# Patient Record
Sex: MALE | Race: BLACK OR AFRICAN AMERICAN | NOT HISPANIC OR LATINO | Employment: PART TIME | URBAN - METROPOLITAN AREA
[De-identification: names, ages, dates, MRNs, and addresses within clinical notes are randomized per-mention and may not be internally consistent; named-entity substitution may affect disease eponyms.]

---

## 2020-09-12 ENCOUNTER — OFFICE VISIT (OUTPATIENT)
Dept: URGENT CARE | Facility: MEDICAL CENTER | Age: 25
End: 2020-09-12
Payer: OTHER GOVERNMENT

## 2020-09-12 VITALS
HEIGHT: 68 IN | TEMPERATURE: 97.5 F | BODY MASS INDEX: 27.28 KG/M2 | OXYGEN SATURATION: 98 % | HEART RATE: 65 BPM | RESPIRATION RATE: 20 BRPM | WEIGHT: 180 LBS

## 2020-09-12 DIAGNOSIS — J02.9 ACUTE PHARYNGITIS, UNSPECIFIED ETIOLOGY: Primary | ICD-10-CM

## 2020-09-12 DIAGNOSIS — Z11.59 SPECIAL SCREENING EXAMINATION FOR UNSPECIFIED VIRAL DISEASE: ICD-10-CM

## 2020-09-12 LAB — S PYO AG THROAT QL: NEGATIVE

## 2020-09-12 PROCEDURE — 87070 CULTURE OTHR SPECIMN AEROBIC: CPT | Performed by: FAMILY MEDICINE

## 2020-09-12 PROCEDURE — U0003 INFECTIOUS AGENT DETECTION BY NUCLEIC ACID (DNA OR RNA); SEVERE ACUTE RESPIRATORY SYNDROME CORONAVIRUS 2 (SARS-COV-2) (CORONAVIRUS DISEASE [COVID-19]), AMPLIFIED PROBE TECHNIQUE, MAKING USE OF HIGH THROUGHPUT TECHNOLOGIES AS DESCRIBED BY CMS-2020-01-R: HCPCS | Performed by: FAMILY MEDICINE

## 2020-09-12 PROCEDURE — 87147 CULTURE TYPE IMMUNOLOGIC: CPT | Performed by: FAMILY MEDICINE

## 2020-09-12 PROCEDURE — G0382 LEV 3 HOSP TYPE B ED VISIT: HCPCS | Performed by: FAMILY MEDICINE

## 2020-09-12 RX ORDER — ALBUTEROL SULFATE 90 UG/1
2 AEROSOL, METERED RESPIRATORY (INHALATION) EVERY 6 HOURS PRN
COMMUNITY

## 2020-09-12 RX ORDER — AMOXICILLIN 875 MG/1
875 TABLET, COATED ORAL 2 TIMES DAILY
Qty: 20 TABLET | Refills: 0 | Status: SHIPPED | OUTPATIENT
Start: 2020-09-12 | End: 2020-09-22

## 2020-09-12 NOTE — PATIENT INSTRUCTIONS
Rapid strep test negative  Throat culture performed  COVID-19 test performed  Patient advised to take Tylenol ibuprofen as needed for sore throat or cough drops  If symptoms persist after 24 hours, he is to fill prescription for amoxicillin 875 mg twice a day for 10 days  Novel Coronavirus   WHAT YOU NEED TO KNOW:   The nCoV is a new strain (type) of coronavirus that can cause severe respiratory (lung) infection  DISCHARGE INSTRUCTIONS:   Call your local emergency number (911 in the [address] [place] [address],[address]) for any of the following:  Tell the  you may have nCoV  This will help anyone in the ambulance stay safe, and to call ahead to the hospital   · You have sudden shortness of breath  · You have a fast heartbeat or chest pain  Return to the emergency department if:   · You feel so dizzy that you have trouble standing up  · Your lips and fingernails turn blue  Call your doctor if:   · You have a fever over 102ºF (39ºC)  · Your sore throat gets worse or you see white or yellow spots in your throat  · Your symptoms get worse after 3 to 5 days or your cold is not better in 14 days  · You have a rash anywhere on your skin  · You have large, tender lumps in your neck  · You have thick, green, or yellow drainage from your nose  · You cough up thick yellow, green, or bloody mucus  · You are vomiting for more than 24 hours and cannot keep fluids down  · You have a bad earache  · You have questions or concerns about your condition or care  Medicines: You may need any of the following:  · Decongestants  help reduce nasal congestion and help you breathe more easily  If you take decongestant pills, they may make you feel restless or cause problems with your sleep  Do not use decongestant sprays for more than a few days  · Cough suppressants  help reduce coughing  Ask your healthcare provider which type of cough medicine is best for you       · NSAIDs , such as ibuprofen, help decrease swelling, pain, and fever  NSAIDs can cause stomach bleeding or kidney problems in certain people  If you take blood thinner medicine, always ask your healthcare provider if NSAIDs are safe for you  Always read the medicine label and follow directions  · Acetaminophen  decreases pain and fever  It is available without a doctor's order  Ask how much to take and how often to take it  Follow directions  Read the labels of all other medicines you are using to see if they also contain acetaminophen, or ask your doctor or pharmacist  Acetaminophen can cause liver damage if not taken correctly  Do not use more than 4 grams (4,000 milligrams) total of acetaminophen in one day  · Take your medicine as directed  Contact your healthcare provider if you think your medicine is not helping or if you have side effects  Tell him or her if you are allergic to any medicine  Keep a list of the medicines, vitamins, and herbs you take  Include the amounts, and when and why you take them  Bring the list or the pill bottles to follow-up visits  Carry your medicine list with you in case of an emergency  Help relieve mild symptoms:   · Drink more liquids as directed  Liquids will help thin and loosen mucus so you can cough it up  Liquids will also help prevent dehydration  Liquids that help prevent dehydration include water, fruit juice, and broth  Do not drink liquids that contain caffeine  Caffeine can increase your risk for dehydration  Ask your healthcare provider how much liquid to drink each day  · Soothe a sore throat  Gargle with warm salt water  This helps your sore throat feel better  Make salt water by dissolving ¼ teaspoon salt in 1 cup warm water  You may also suck on hard candy or throat lozenges  You may use a sore throat spray  · Use a humidifier or vaporizer  Use a cool mist humidifier or a vaporizer to increase air moisture in your home   This may make it easier for you to breathe and help decrease your cough     · Use saline nasal drops as directed  These help relieve congestion  · Apply petroleum-based jelly around the outside of your nostrils  This can decrease irritation from blowing your nose  · Do not smoke  Nicotine and other chemicals in cigarettes and cigars can make your symptoms worse  They can also cause infections such as bronchitis or pneumonia  Ask your healthcare provider for information if you currently smoke and need help to quit  E-cigarettes or smokeless tobacco still contain nicotine  Talk to your healthcare provider before you use these products  Prevent the spread of nCoV:  If you are around anyone who has nCoV, the following can help you protect you from infection  Have the person follow the guidelines to prevent infecting others:  · Limit close contact with others  Anyone with nCoV should stay in a different room, or sleep in a separate bed  Others need to stay at least 6 feet (2 meters) away  The person should only go out of the house for medical appointments  He or she should always call the office of any healthcare provider  The provider will need to prepare to keep others safe  · Wear a medical mask when around others  You can wear a medical mask or have the person wear one  A medical mask will help prevent nCoV from spreading  · Cover your mouth and nose to sneeze or cough  Everyone should always cover a sneeze or cough  Use a tissue that covers the mouth and nose  Use the bend of our arm if a tissue is not available  Throw the tissue away in a trash can right away  Then wash your hands well with soap and water  Use hand  that contains alcohol if you cannot wash your hands  · Wash your hands often  You and everyone in your home must wash your hands throughout the day  Use soap and water  Use germ-killing gel if soap and water are not available   A person with nCoV should not touch his or her eyes or mouth without washing his or her hands first  · Do not share items  Do not share dishes, towels, or other items with anyone  Always wash items after a person who has nCoV uses them  · Clean surfaces often  Use household  or bleach diluted with water to clean counters, doorknobs, toilet seats, and other surfaces  · Do not handle live animals  The nCoV may be spread to animals, including pets  Until more is known, it is best for a person with nCoV not to touch, play with, or handle live animals  Follow up with your doctor as directed:  Write down your questions so you remember to ask them during your visits  © Copyright [address] Information is for End User's use only and may not be sold, redistributed or otherwise used for commercial purposes  All illustrations and images included in CareNotes® are the copyrighted property of A D A M , Inc  or Loida Parada   The above information is an  only  It is not intended as medical advice for individual conditions or treatments  Talk to your doctor, nurse or pharmacist before following any medical regimen to see if it is safe and effective for you  Pharyngitis   WHAT YOU NEED TO KNOW:   Pharyngitis, or sore throat, is inflammation of the tissues and structures in your pharynx (throat)  Pharyngitis is most often caused by bacteria  It may also be caused by a cold or flu virus  Other causes include smoking, allergies, or acid reflux  DISCHARGE INSTRUCTIONS:   Call 911 for any of the following:   · You have trouble breathing or swallowing because your throat is swollen or sore  Return to the emergency department if:   · You are drooling because it hurts too much to swallow  · Your fever is higher than 102? F (39?C) or lasts longer than 3 days  · You are confused  · You taste blood in your throat  Contact your healthcare provider if:   · Your throat pain gets worse  · You have a painful lump in your throat that does not go away after 5 days      · Your symptoms do not improve after 5 days  · You have questions or concerns about your condition or care  Medicines:  Viral pharyngitis will go away on its own without treatment  Your sore throat should start to feel better in 3 to 5 days for both viral and bacterial infections  You may need any of the following:  · Antibiotics  treat a bacterial infection  · NSAIDs , such as ibuprofen, help decrease swelling, pain, and fever  NSAIDs can cause stomach bleeding or kidney problems in certain people  If you take blood thinner medicine, always ask your healthcare provider if NSAIDs are safe for you  Always read the medicine label and follow directions  · Acetaminophen  decreases pain and fever  It is available without a doctor's order  Ask how much to take and how often to take it  Follow directions  Acetaminophen can cause liver damage if not taken correctly  · Take your medicine as directed  Contact your healthcare provider if you think your medicine is not helping or if you have side effects  Tell him or her if you are allergic to any medicine  Keep a list of the medicines, vitamins, and herbs you take  Include the amounts, and when and why you take them  Bring the list or the pill bottles to follow-up visits  Carry your medicine list with you in case of an emergency  Manage your symptoms:   · Gargle salt water  Mix ¼ teaspoon salt in an 8 ounce glass of warm water and gargle  This may help decrease swelling in your throat  · Drink liquids as directed  You may need to drink more liquids than usual  Liquids may help soothe your throat and prevent dehydration  Ask how much liquid to drink each day and which liquids are best for you  · Use a cool-steam humidifier  to help moisten the air in your room and calm your cough  · Soothe your throat  with cough drops, ice, soft foods, or popsicles  Prevent the spread of pharyngitis:  Cover your mouth and nose when you cough or sneeze   Do not share food or drinks  Wash your hands often  Use soap and water  If soap and water are unavailable, use an alcohol based hand   Follow up with your healthcare provider as directed:  Write down your questions so you remember to ask them during your visits  © 2017 [address] Information is for End User's use only and may not be sold, redistributed or otherwise used for commercial purposes  All illustrations and images included in CareNotes® are the copyrighted property of A D A M , Inc  or Maximiliano Tavarez  The above information is an  only  It is not intended as medical advice for individual conditions or treatments  Talk to your doctor, nurse or pharmacist before following any medical regimen to see if it is safe and effective for you

## 2020-09-12 NOTE — LETTER
September 12, 2020     Patient: Eugenia Juarez   YOB: 1995   Date of Visit: 9/12/2020       To Whom It May Concern: It is my medical opinion that Eugenia Juarez may return to work on 9/16/2020  If you have any questions or concerns, please don't hesitate to call           Sincerely,        Angeline Orr MD    CC: No Recipients

## 2020-09-12 NOTE — PROGRESS NOTES
[facility]        NAME: Fide Rossi is a 22 y o  male  : 1995    MRN: [id number]  DATE: 2020  TIME: 2:26 PM    Assessment and Plan   Acute pharyngitis, unspecified etiology [J02 9]  1  Acute pharyngitis, unspecified etiology  POCT rapid strepA    Throat culture    amoxicillin (AMOXIL) 875 mg tablet   2  Special screening examination for unspecified viral disease  Novel Coronavirus (COVID-19), PCR [facility] - Office Collection         Patient Instructions       Follow up with PCP in 3-5 days  Proceed to  ER if symptoms worsen  Chief Complaint     Chief Complaint   Patient presents with    Sore Throat     Patient presents with a sore throat since this morning  He denies fever, chills, SOB, fatigue, change in taste or smell  Patient was in contact with his girlfriend who is positive for strep, but did not yet get her results for covid  Patient needs to be tested in order to return to work  History of Present Illness       50-year-old male here today with acute onset of scratchy throat this morning and chest tightness  At the present time on exam, he is asymptomatic  Zakia Skipper However, denies fever, chills  Denies nausea, vomiting or diarrhea  However he expresses concern because he found out that his girlfriend tested positive for strep throat yesterday  She was also tested for COVID-19 however test results are still pending  Patient denies any known contact with anyone testing positive for COVID-19  Review of Systems   Review of Systems   Constitutional: Negative  HENT: Positive for congestion and sore throat  Respiratory: Negative  Gastrointestinal: Negative  Neurological: Negative            Current Medications       Current Outpatient Medications:     albuterol (PROVENTIL HFA,VENTOLIN HFA) 90 mcg/act inhaler, Inhale 2 puffs every 6 (six) hours as needed for wheezing, Disp: , Rfl:     amoxicillin (AMOXIL) 875 mg tablet, Take 1 tablet (875 mg total) by mouth 2 (two) times a day for 10 days, Disp: 20 tablet, Rfl: 0    Current Allergies     Allergies as of 09/12/2020    (No Known Allergies)            The following portions of the patient's history were reviewed and updated as appropriate: allergies, current medications, past family history, past medical history, past social history, past surgical history and problem list      Past Medical History:   Diagnosis Date    Asthma        History reviewed  No pertinent surgical history  History reviewed  No pertinent family history  Medications have been verified  Objective   Pulse 65   Temp 97 5 °F (36 4 °C) (Tympanic)   Resp 20   Ht 5' 8" (1 727 m)   Wt 81 6 kg (180 lb)   SpO2 98%   BMI 27 37 kg/m²        Physical Exam     Physical Exam  Vitals signs reviewed  Constitutional:       Appearance: He is well-developed  HENT:      Nose:      Comments: Erythematous hypertrophic boggy left turbinates  Mouth/Throat:      Comments: Cobblestoning observed the posterior pharynx  No exudates visualized  Neck:      Musculoskeletal: Normal range of motion  Pulmonary:      Effort: Pulmonary effort is normal       Breath sounds: Normal breath sounds  Neurological:      Mental Status: He is alert

## 2020-09-14 LAB — SARS-COV-2 RNA SPEC QL NAA+PROBE: NOT DETECTED

## 2020-09-15 ENCOUNTER — TELEPHONE (OUTPATIENT)
Dept: URGENT CARE | Facility: MEDICAL CENTER | Age: 25
End: 2020-09-15

## 2020-09-15 LAB — BACTERIA THROAT CULT: ABNORMAL

## 2020-09-15 NOTE — TELEPHONE ENCOUNTER
Spoke with the patient regarding throat culture which grew strep group C  Currently he is asymptomatic  He did not fill prescription for amoxicillin  Strongly advised patient to fill prescription for amoxicillin complete course  He expressed understanding

## 2021-02-16 ENCOUNTER — HOSPITAL ENCOUNTER (INPATIENT)
Facility: HOSPITAL | Age: 26
LOS: 3 days | Discharge: HOME | DRG: 885 | End: 2021-02-19
Attending: STUDENT IN AN ORGANIZED HEALTH CARE EDUCATION/TRAINING PROGRAM | Admitting: PSYCHIATRY & NEUROLOGY

## 2021-02-16 ENCOUNTER — HOSPITAL ENCOUNTER (EMERGENCY)
Facility: HOSPITAL | Age: 26
End: 2021-02-16
Attending: EMERGENCY MEDICINE | Admitting: EMERGENCY MEDICINE

## 2021-02-16 VITALS
BODY MASS INDEX: 27.52 KG/M2 | OXYGEN SATURATION: 100 % | DIASTOLIC BLOOD PRESSURE: 88 MMHG | HEART RATE: 75 BPM | TEMPERATURE: 98.6 F | SYSTOLIC BLOOD PRESSURE: 157 MMHG | RESPIRATION RATE: 18 BRPM | WEIGHT: 181 LBS

## 2021-02-16 DIAGNOSIS — R45.851 DEPRESSION WITH SUICIDAL IDEATION: Primary | ICD-10-CM

## 2021-02-16 DIAGNOSIS — R45.851 DEPRESSION WITH SUICIDAL IDEATION: ICD-10-CM

## 2021-02-16 DIAGNOSIS — Z72.89 SELF MUTILATING BEHAVIOR: ICD-10-CM

## 2021-02-16 DIAGNOSIS — F33.9 RECURRENT MAJOR DEPRESSIVE DISORDER (HCC): ICD-10-CM

## 2021-02-16 DIAGNOSIS — F41.9 ANXIETY: Primary | ICD-10-CM

## 2021-02-16 DIAGNOSIS — F32.A DEPRESSION WITH SUICIDAL IDEATION: ICD-10-CM

## 2021-02-16 DIAGNOSIS — F32.A DEPRESSION WITH SUICIDAL IDEATION: Primary | ICD-10-CM

## 2021-02-16 DIAGNOSIS — Z72.0 TOBACCO ABUSE: ICD-10-CM

## 2021-02-16 LAB
AMPHETAMINES SERPL QL SCN: NEGATIVE
BARBITURATES UR QL: NEGATIVE
BENZODIAZ UR QL: NEGATIVE
COCAINE UR QL: NEGATIVE
ETHANOL EXG-MCNC: 0.03 MG/DL
FLUAV RNA RESP QL NAA+PROBE: NEGATIVE
FLUBV RNA RESP QL NAA+PROBE: NEGATIVE
METHADONE UR QL: NEGATIVE
OPIATES UR QL SCN: NEGATIVE
OXYCODONE+OXYMORPHONE UR QL SCN: NEGATIVE
PCP UR QL: NEGATIVE
RSV RNA RESP QL NAA+PROBE: NEGATIVE
SARS-COV-2 RNA RESP QL NAA+PROBE: NEGATIVE
THC UR QL: NEGATIVE

## 2021-02-16 PROCEDURE — 99285 EMERGENCY DEPT VISIT HI MDM: CPT | Performed by: EMERGENCY MEDICINE

## 2021-02-16 PROCEDURE — 0241U HB NFCT DS VIR RESP RNA 4 TRGT: CPT | Performed by: EMERGENCY MEDICINE

## 2021-02-16 PROCEDURE — 80307 DRUG TEST PRSMV CHEM ANLYZR: CPT | Performed by: EMERGENCY MEDICINE

## 2021-02-16 PROCEDURE — 82075 ASSAY OF BREATH ETHANOL: CPT | Performed by: EMERGENCY MEDICINE

## 2021-02-16 PROCEDURE — 99285 EMERGENCY DEPT VISIT HI MDM: CPT

## 2021-02-16 RX ORDER — RISPERIDONE 0.5 MG/1
0.5 TABLET, ORALLY DISINTEGRATING ORAL
Status: DISCONTINUED | OUTPATIENT
Start: 2021-02-16 | End: 2021-02-19 | Stop reason: HOSPADM

## 2021-02-16 RX ORDER — OLANZAPINE 10 MG/1
10 TABLET, ORALLY DISINTEGRATING ORAL ONCE
Status: DISCONTINUED | OUTPATIENT
Start: 2021-02-16 | End: 2021-02-16 | Stop reason: SDUPTHER

## 2021-02-16 RX ORDER — BENZTROPINE MESYLATE 1 MG/ML
1 INJECTION INTRAMUSCULAR; INTRAVENOUS
Status: DISCONTINUED | OUTPATIENT
Start: 2021-02-16 | End: 2021-02-19 | Stop reason: HOSPADM

## 2021-02-16 RX ORDER — LORAZEPAM 2 MG/ML
2 INJECTION INTRAMUSCULAR
Status: DISCONTINUED | OUTPATIENT
Start: 2021-02-16 | End: 2021-02-19 | Stop reason: HOSPADM

## 2021-02-16 RX ORDER — HYDROXYZINE HYDROCHLORIDE 25 MG/1
50 TABLET, FILM COATED ORAL
Status: CANCELLED | OUTPATIENT
Start: 2021-02-16

## 2021-02-16 RX ORDER — DIPHENHYDRAMINE HYDROCHLORIDE 50 MG/ML
50 INJECTION INTRAMUSCULAR; INTRAVENOUS EVERY 6 HOURS PRN
Status: CANCELLED | OUTPATIENT
Start: 2021-02-16

## 2021-02-16 RX ORDER — NICOTINE 21 MG/24HR
1 PATCH, TRANSDERMAL 24 HOURS TRANSDERMAL DAILY
Status: DISCONTINUED | OUTPATIENT
Start: 2021-02-17 | End: 2021-02-19 | Stop reason: HOSPADM

## 2021-02-16 RX ORDER — NICOTINE 21 MG/24HR
1 PATCH, TRANSDERMAL 24 HOURS TRANSDERMAL DAILY
Status: CANCELLED | OUTPATIENT
Start: 2021-02-17

## 2021-02-16 RX ORDER — OLANZAPINE 5 MG/1
10 TABLET, ORALLY DISINTEGRATING ORAL ONCE
Status: COMPLETED | OUTPATIENT
Start: 2021-02-16 | End: 2021-02-16

## 2021-02-16 RX ORDER — TRAZODONE HYDROCHLORIDE 50 MG/1
50 TABLET ORAL
Status: DISCONTINUED | OUTPATIENT
Start: 2021-02-16 | End: 2021-02-19 | Stop reason: HOSPADM

## 2021-02-16 RX ORDER — HALOPERIDOL 5 MG/ML
5 INJECTION INTRAMUSCULAR
Status: DISCONTINUED | OUTPATIENT
Start: 2021-02-16 | End: 2021-02-19 | Stop reason: HOSPADM

## 2021-02-16 RX ORDER — HALOPERIDOL 5 MG/ML
5 INJECTION INTRAMUSCULAR
Status: CANCELLED | OUTPATIENT
Start: 2021-02-16

## 2021-02-16 RX ORDER — ACETAMINOPHEN 325 MG/1
650 TABLET ORAL EVERY 8 HOURS PRN
Status: DISCONTINUED | OUTPATIENT
Start: 2021-02-16 | End: 2021-02-19 | Stop reason: HOSPADM

## 2021-02-16 RX ORDER — LORAZEPAM 1 MG/1
1 TABLET ORAL EVERY 6 HOURS PRN
Status: CANCELLED | OUTPATIENT
Start: 2021-02-16

## 2021-02-16 RX ORDER — NICOTINE 21 MG/24HR
1 PATCH, TRANSDERMAL 24 HOURS TRANSDERMAL DAILY
Status: DISCONTINUED | OUTPATIENT
Start: 2021-02-17 | End: 2021-02-16 | Stop reason: SDUPTHER

## 2021-02-16 RX ORDER — NICOTINE 21 MG/24HR
21 PATCH, TRANSDERMAL 24 HOURS TRANSDERMAL ONCE
Status: CANCELLED | OUTPATIENT
Start: 2021-02-16 | End: 2021-02-16

## 2021-02-16 RX ORDER — ACETAMINOPHEN 325 MG/1
650 TABLET ORAL EVERY 8 HOURS PRN
Status: DISCONTINUED | OUTPATIENT
Start: 2021-02-16 | End: 2021-02-16 | Stop reason: HOSPADM

## 2021-02-16 RX ORDER — NICOTINE 21 MG/24HR
21 PATCH, TRANSDERMAL 24 HOURS TRANSDERMAL ONCE
Status: DISCONTINUED | OUTPATIENT
Start: 2021-02-16 | End: 2021-02-16 | Stop reason: HOSPADM

## 2021-02-16 RX ORDER — NICOTINE 21 MG/24HR
21 PATCH, TRANSDERMAL 24 HOURS TRANSDERMAL ONCE
Status: DISCONTINUED | OUTPATIENT
Start: 2021-02-16 | End: 2021-02-16 | Stop reason: SDUPTHER

## 2021-02-16 RX ORDER — OLANZAPINE 5 MG/1
10 TABLET, ORALLY DISINTEGRATING ORAL ONCE
Status: CANCELLED | OUTPATIENT
Start: 2021-02-16 | End: 2021-02-16

## 2021-02-16 RX ORDER — LORAZEPAM 1 MG/1
1 TABLET ORAL EVERY 6 HOURS PRN
Status: DISCONTINUED | OUTPATIENT
Start: 2021-02-16 | End: 2021-02-19 | Stop reason: HOSPADM

## 2021-02-16 RX ORDER — LORAZEPAM 2 MG/ML
2 INJECTION INTRAMUSCULAR
Status: CANCELLED | OUTPATIENT
Start: 2021-02-16

## 2021-02-16 RX ORDER — BENZTROPINE MESYLATE 1 MG/ML
1 INJECTION INTRAMUSCULAR; INTRAVENOUS
Status: CANCELLED | OUTPATIENT
Start: 2021-02-16

## 2021-02-16 RX ORDER — HYDROXYZINE 50 MG/1
50 TABLET, FILM COATED ORAL
Status: DISCONTINUED | OUTPATIENT
Start: 2021-02-16 | End: 2021-02-19 | Stop reason: HOSPADM

## 2021-02-16 RX ORDER — LORAZEPAM 1 MG/1
1 TABLET ORAL EVERY 6 HOURS PRN
Status: DISCONTINUED | OUTPATIENT
Start: 2021-02-16 | End: 2021-02-16 | Stop reason: HOSPADM

## 2021-02-16 RX ORDER — RISPERIDONE 0.5 MG/1
0.5 TABLET, ORALLY DISINTEGRATING ORAL
Status: CANCELLED | OUTPATIENT
Start: 2021-02-16

## 2021-02-16 RX ORDER — ACETAMINOPHEN 325 MG/1
650 TABLET ORAL EVERY 8 HOURS PRN
Status: CANCELLED | OUTPATIENT
Start: 2021-02-16

## 2021-02-16 RX ORDER — DIPHENHYDRAMINE HYDROCHLORIDE 50 MG/ML
50 INJECTION INTRAMUSCULAR; INTRAVENOUS EVERY 6 HOURS PRN
Status: DISCONTINUED | OUTPATIENT
Start: 2021-02-16 | End: 2021-02-19 | Stop reason: HOSPADM

## 2021-02-16 RX ORDER — TRAZODONE HYDROCHLORIDE 100 MG/1
50 TABLET ORAL
Status: CANCELLED | OUTPATIENT
Start: 2021-02-16

## 2021-02-16 RX ADMIN — OLANZAPINE 10 MG: 5 TABLET, ORALLY DISINTEGRATING ORAL at 15:58

## 2021-02-16 RX ADMIN — NICOTINE 21 MG: 21 PATCH, EXTENDED RELEASE TRANSDERMAL at 18:05

## 2021-02-16 NOTE — LETTER
[facility] EMERGENCY DEPARTMENT  [address] [facility] [address] [phone]  Dept: [phone]      EMTALA TRANSFER CONSENT    NAME Anselmo RAMAN 1995                              MRN [id number]    I have been informed of my rights regarding examination, treatment, and transfer   by Dr Jose De Jesus Duenas DO    Benefits: Continuity of care    Risks: Potential for delay in receiving treatment      Consent for Transfer:  I acknowledge that my medical condition has been evaluated and explained to me by the emergency department physician or other qualified medical person and/or my attending physician, who has recommended that I be transferred to the service of  Accepting Physician: Dr Alan Baez at [address], [address] : [facility]  The above potential benefits of such transfer, the potential risks associated with such transfer, and the probable risks of not being transferred have been explained to me, and I fully understand them  The doctor has explained that, in my case, the benefits of transfer outweigh the risks  I agree to be transferred  I authorize the performance of emergency medical procedures and treatments upon me in both transit and upon arrival at the receiving facility  Additionally, I authorize the release of any and all medical records to the receiving facility and request they be transported with me, if possible  I understand that the safest mode of transportation during a medical emergency is an ambulance and that the Hospital advocates the use of this mode of transport  Risks of traveling to the receiving facility by car, including absence of medical control, life sustaining equipment, such as oxygen, and medical personnel has been explained to me and I fully understand them      (MELITON CORRECT BOX BELOW)  [  ]  I consent to the stated transfer and to be transported by ambulance/helicopter  [  ]  I consent to the stated transfer, but refuse transportation by ambulance and accept full responsibility for my transportation by car  I understand the risks of non-ambulance transfers and I exonerate the Hospital and its staff from any deterioration in my condition that results from this refusal     X___________________________________________    DATE  21  TIME________  Signature of patient or legally responsible individual signing on patient behalf           RELATIONSHIP TO PATIENT_________________________          Provider Certification    NAME Adonay Bautista                                         1995                              MRN [id number]    A medical screening exam was performed on the above named patient  Based on the examination:    Condition Necessitating Transfer The primary encounter diagnosis was Depression with suicidal ideation  Diagnoses of Self mutilating behavior and Tobacco abuse were also pertinent to this visit  Patient Condition: Other (Include comment)_________________________________________    Reason for Transfer: No bed available at level of patient's needs    Transfer Requirements: [address] [place] [address]   · Space available and qualified personnel available for treatment as acknowledged by Klever Sosa ; [phone]  · Agreed to accept transfer and to provide appropriate medical treatment as acknowledged by       Dr Salma Muñiz  · Appropriate medical records of the examination and treatment of the patient are provided at the time of transfer   [facility], [address] _______  · Transfer will be performed by qualified personnel from [address] [place] [address]  and appropriate transfer equipment as required, including the use of necessary and appropriate life support measures      Provider Certification: I have examined the patient and explained the following risks and benefits of being transferred/refusing transfer to the patient/family:  General risk, such as traffic hazards, adverse weather conditions, rough terrain or turbulence, possible failure of equipment (including vehicle or aircraft), or consequences of actions of persons outside the control of the transport personnel      Based on these reasonable risks and benefits to the patient and/or the unborn child(liyah), and based upon the information available at the time of the patients examination, I certify that the medical benefits reasonably to be expected from the provision of appropriate medical treatments at another medical facility outweigh the increasing risks, if any, to the individuals medical condition, and in the case of labor to the unborn child, from effecting the transfer      X____________________________________________ DATE 02/16/21        TIME_______      ORIGINAL - SEND TO MEDICAL RECORDS   COPY - SEND WITH PATIENT DURING TRANSFER
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
## 2021-02-16 NOTE — ED NOTES
Insurance         EVS (Eligibility Verification System) called - [phone]    Automated system indicates: no active for Medical assistance    Asked patient and he said that he has no health insurance

## 2021-02-16 NOTE — ED NOTES
Room is made safe for patient and he is placed on 1-1 monitoring      Lyubov Bradshaw RN  02/16/21 [id number]
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
## 2021-02-16 NOTE — ED NOTES
Pt refused to have anything to eat for dinner   He is laying in his room quietly      Selene [facility]  02/16/21 1988

## 2021-02-17 PROBLEM — F33.9 RECURRENT MAJOR DEPRESSIVE DISORDER (HCC): Status: ACTIVE | Noted: 2021-02-17

## 2021-02-17 PROBLEM — Z00.8 MEDICAL CLEARANCE FOR PSYCHIATRIC ADMISSION: Status: ACTIVE | Noted: 2021-02-17

## 2021-02-17 PROBLEM — F41.9 ANXIETY: Status: ACTIVE | Noted: 2021-02-17

## 2021-02-17 LAB
BASOPHILS # BLD AUTO: 0.01 THOUSANDS/ΜL (ref 0–0.1)
BASOPHILS NFR BLD AUTO: 0 % (ref 0–1)
EOSINOPHIL # BLD AUTO: 0.03 THOUSAND/ΜL (ref 0–0.61)
EOSINOPHIL NFR BLD AUTO: 1 % (ref 0–6)
ERYTHROCYTE [DISTWIDTH] IN BLOOD BY AUTOMATED COUNT: 12.5 % (ref 11.6–15.1)
HCT VFR BLD AUTO: 43.3 % (ref 36.5–49.3)
HGB BLD-MCNC: 13.9 G/DL (ref 12–17)
IMM GRANULOCYTES # BLD AUTO: 0 THOUSAND/UL (ref 0–0.2)
IMM GRANULOCYTES NFR BLD AUTO: 0 % (ref 0–2)
LYMPHOCYTES # BLD AUTO: 2.04 THOUSANDS/ΜL (ref 0.6–4.47)
LYMPHOCYTES NFR BLD AUTO: 47 % (ref 14–44)
MCH RBC QN AUTO: 27.9 PG (ref 26.8–34.3)
MCHC RBC AUTO-ENTMCNC: 32.1 G/DL (ref 31.4–37.4)
MCV RBC AUTO: 87 FL (ref 82–98)
MONOCYTES # BLD AUTO: 0.32 THOUSAND/ΜL (ref 0.17–1.22)
MONOCYTES NFR BLD AUTO: 8 % (ref 4–12)
NEUTROPHILS # BLD AUTO: 1.87 THOUSANDS/ΜL (ref 1.85–7.62)
NEUTS SEG NFR BLD AUTO: 44 % (ref 43–75)
NRBC BLD AUTO-RTO: 0 /100 WBCS
PLATELET # BLD AUTO: 280 THOUSANDS/UL (ref 149–390)
PMV BLD AUTO: 9.4 FL (ref 8.9–12.7)
RBC # BLD AUTO: 4.98 MILLION/UL (ref 3.88–5.62)
TSH SERPL DL<=0.05 MIU/L-ACNC: 0.38 UIU/ML (ref 0.36–3.74)
WBC # BLD AUTO: 4.27 THOUSAND/UL (ref 4.31–10.16)

## 2021-02-17 PROCEDURE — 99253 IP/OBS CNSLTJ NEW/EST LOW 45: CPT | Performed by: NURSE PRACTITIONER

## 2021-02-17 PROCEDURE — 84443 ASSAY THYROID STIM HORMONE: CPT | Performed by: PSYCHIATRY & NEUROLOGY

## 2021-02-17 PROCEDURE — 99222 1ST HOSP IP/OBS MODERATE 55: CPT | Performed by: STUDENT IN AN ORGANIZED HEALTH CARE EDUCATION/TRAINING PROGRAM

## 2021-02-17 PROCEDURE — 85025 COMPLETE CBC W/AUTO DIFF WBC: CPT | Performed by: PSYCHIATRY & NEUROLOGY

## 2021-02-17 RX ORDER — ESCITALOPRAM OXALATE 5 MG/1
5 TABLET ORAL DAILY
Status: DISCONTINUED | OUTPATIENT
Start: 2021-02-17 | End: 2021-02-19 | Stop reason: HOSPADM

## 2021-02-17 RX ORDER — ALBUTEROL SULFATE 90 UG/1
2 AEROSOL, METERED RESPIRATORY (INHALATION) EVERY 4 HOURS PRN
Status: DISCONTINUED | OUTPATIENT
Start: 2021-02-17 | End: 2021-02-19 | Stop reason: HOSPADM

## 2021-02-17 RX ADMIN — ESCITALOPRAM 5 MG: 5 TABLET, FILM COATED ORAL at 10:53

## 2021-02-17 RX ADMIN — TRAZODONE HYDROCHLORIDE 50 MG: 50 TABLET ORAL at 22:07

## 2021-02-17 RX ADMIN — HYDROXYZINE HYDROCHLORIDE 50 MG: 50 TABLET, FILM COATED ORAL at 12:08

## 2021-02-17 NOTE — PLAN OF CARE
Problem: SELF HARM/SUICIDALITY  Goal: Will have no self-injury during hospital stay  Description: INTERVENTIONS:  - Q 15 MINUTES: Routine safety checks  - Q WAKING SHIFT & PRN: Assess risk to determine if routine checks are adequate to maintain patient safety  - Encourage patient to participate actively in care by formulating a plan to combat response to suicidal ideation, identify supports and resources  Outcome: Progressing     Problem: DEPRESSION  Goal: Will be euthymic at discharge  Description: INTERVENTIONS:  - Administer medication as ordered  - Provide emotional support via 1:1 interaction with staff  - Encourage involvement in milieu/groups/activities  - Monitor for social isolation  Outcome: Progressing     Problem: ANXIETY  Goal: Will report anxiety at manageable levels  Description: INTERVENTIONS:  - Administer medication as ordered  - Teach and encourage coping skills  - Provide emotional support  - Assess patient/family for anxiety and ability to cope  Outcome: Progressing  Goal: By discharge: Patient will verbalize 2 strategies to deal with anxiety  Description: Interventions:  - Identify any obvious source/trigger to anxiety  - Staff will assist patient in applying identified coping technique/skills  - Encourage attendance of scheduled groups and activities  Outcome: Progressing
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
## 2021-02-17 NOTE — TREATMENT PLAN
TREATMENT PLAN REVIEW - [address] 25 y o  1995 male MRN: [id number]    [address] [place] [address] Room / Bed: [facility] 251/[facility] 251-01 Encounter: [id number]          Admit Date/Time:  2/16/2021 10:34 PM    Treatment Team: Attending Provider: Erendira Henderson MD; Registered Nurse: Jaky Cardenas, RN; Patient Care Technician: Meghan Yadav; Licensed Practical Nurse: Marcia Ndiaye LPN; Care Manager: Abiola Corado RN; Patient Care Technician: Fredi Flaherty; Registered Nurse: Catarina Landau, KEO; Charge Nurse: Mariann Pallas, RN; Registered Nurse: Briseida Paez RN; Occupational Therapy Assistant: GRIFFIN Neumann; : Santos Gan; Security: Rachael García;  : Edith Cordova; Patient Care Assistant: Brett Can    Diagnosis: Principal Problem:    Recurrent major depressive disorder [facility])  Active Problems:    Depression    Asthma    Medical clearance for psychiatric admission    Anxiety      Patient Strengths/Assets: cooperative, motivated, motivation for treatment/growth, patient is on a voluntary commitment    Patient Barriers/Limitations: marital/family conflict    Short Term Goals: decrease in depressive symptoms, decrease in anxiety symptoms, decrease in suicidal thoughts, decrease in self abusive behaviors    Long Term Goals: improvement in depression, improvement in anxiety, stabilization of mood, free of suicidal thoughts, no self abusive behavior    Progress Towards Goals: starting psychiatric medications as prescribed    Recommended Treatment: medication management, patient medication education, group therapy, milieu therapy, continued Behavioral Health psychiatric evaluation/assessment process    Treatment Frequency: daily medication monitoring, group and milieu therapy daily, monitoring through interdisciplinary rounds, monitoring through weekly patient care conferences    Expected Discharge Date:  5-7 days    Discharge Plan: referral for outpatient medication management with a psychiatrist, referral for outpatient psychotherapy    Treatment Plan Created/Updated By: Azael Arndt MD

## 2021-02-17 NOTE — PROGRESS NOTES
In room:  Jacket, long sleeve top, and shorts    In locker:  Sneakers with laces, grey metal ring, wallet, cell phone in case, 10 cents, car insurance card, visa (7824), visa [phone]), visa (3755), visa (9040), visa (6667), discover (1240), discover (9380), [place], [place] card
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
## 2021-02-17 NOTE — CMS CERTIFICATION NOTE
Recertification: Based upon physical, mental and social evaluations, I certify that inpatient psychiatric services continue to be medically necessary for this patient for a duration of 7 midnights for the treatment of  Recurrent major depressive disorder ([facility] [address] )   Available alternative community resources still do not meet the patient's mental health care needs  I further attest that an established written individualized plan of care has been updated and is outlined in the patient's medical records

## 2021-02-17 NOTE — PROGRESS NOTES
Pt is cooperative  Depressed affect  Seclusive to room at times, attending groups, complaint with medication, and OOB for meals  Denies SI/HI and AVH  Pt upset about incident last night  Think about it causes anxiety  Tried utilizing coping mechanisms first  Pt received Atarax 50 mg po for moderate anxiety @1208  Birch score 18  Denies any question or concerns at this time  Upon follow up, pt is bed sleeping  Medication effective

## 2021-02-17 NOTE — PROGRESS NOTES
Status: New admission  201 from [facility]  Pt SI to cut self with a knife  Superficial cuts to neck  Pt had argument with girlfriend and reported having relationship stressors since September  Pt UDS negative  Reported drinking 3-4 beers before going to ED  Pt calm and cooperative upon admission  Reported [facility] All[facility] upon admission       Medication: No changes / PRN: None     D/C: TBD - Dc date not yet determined        02/17/21 1907   Team Meeting   Meeting Type Daily Rounds   Team Members Present   Team Members Present Physician;Nurse;;Occupational Therapist   Physician Team Member Dr Laura Strong / Lizy Brice Team Member Nico Kamara / Tia Goldberg Management Team Member Mars Cortes / Marc Bauman   OT Team Member Urmila Lee / Misti Galindo   Patient/Family Present   Patient Present No   Patient's Family Present No

## 2021-02-17 NOTE — PROGRESS NOTES
Pt is visible in the milieu with a flat affect and is noted to be isolative in the dayroom  Pt reports to this writer that he has attended all groups and SI/HI/AVH  Pt denies having any questions or concerns

## 2021-02-17 NOTE — ED NOTES
Patient is accepted at [facility]  Patient is accepted by Dr Salma Muñiz per Sheila/Fredi  Transportation is arranged with [facility]  Transportation is scheduled for 10:30PM    Patient may go to the floor at anytime  Nurse report is to be called to [phone] prior to patient transfer
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
## 2021-02-17 NOTE — PROGRESS NOTES
RN will meet with pt twice daily to educate on diagnoses, medications and assess symptoms  We will teach and utilize healthy coping skills

## 2021-02-18 PROCEDURE — 99232 SBSQ HOSP IP/OBS MODERATE 35: CPT | Performed by: PHYSICIAN ASSISTANT

## 2021-02-18 RX ADMIN — HYDROXYZINE HYDROCHLORIDE 50 MG: 50 TABLET, FILM COATED ORAL at 09:00

## 2021-02-18 RX ADMIN — Medication 1 PATCH: at 08:59

## 2021-02-18 RX ADMIN — ESCITALOPRAM 5 MG: 5 TABLET, FILM COATED ORAL at 08:59

## 2021-02-18 NOTE — PROGRESS NOTES
Diagnosis of Recurrent major depressive disorder reviewed  Short term goals for decrease in depressive symptoms, decrease in anxiety symptoms, decrease in suicidal thoughts, decrease in self abusive behaviors discussed  Pt requesting dc today  Team is in agreement with that plan  Pt will be transported home via [address] [place] [address]  Pt would like referral for [facility]   All parties in agreement and treatment plan signed          02/19/21 1030   Team Meeting   Meeting Type Tx Team Meeting   Team Members Present   Team Members Present Physician;Nurse;   Physician Team Member Dr Cayla So Team Member [facility] Management Team Member Hua   Patient/Family Present   Patient Present Yes   Patient's Family Present No
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
## 2021-02-18 NOTE — PROGRESS NOTES
02/18/21 1000 02/18/21 1045 02/18/21 1153   Activity/Group Checklist   Group Community meeting  (Goal cards) Admission/Discharge  (Admission/Self Assessment) Nursing Education  (Perspective Teaching)   Attendance Attended Attended Attended   Attendance Duration (min) 31-45 0-15 31-45   Interactions Interacted appropriately  (engaged in group discussion) Interacted appropriately Interacted appropriately   Affect/Mood Appropriate Appropriate Appropriate;Calm   Goals Achieved Able to listen to others; Able to engage in interactions Identified feelings; Identified triggers; Discussed coping strategies Able to listen to others; Able to self-disclose; Able to engage in interactions; Able to recieve feedback      02/18/21 1315   Activity/Group Checklist   Group Life Skills  (Nonverbal communication)   Attendance Attended   Attendance Duration (min) 46-60   Interactions Interacted appropriately   Affect/Mood Bright   Goals Achieved Able to engage in interactions; Able to listen to others; Identified feelings     Pt attended 4/4 unit activities  He offers insightful responses in group discussions and was very animated in the afternoon role-playing activity  Pt is social with peers and presents with a bright affect

## 2021-02-18 NOTE — PROGRESS NOTES
Status: Pt denies SI/Anxiety/Depression  Visible on the unit, attends groups  Remorseful for his actions       Medication: No changes / PRN: Atarax 50mg, Trazodone 50mg     D/C: Monday (?)      02/18/21 0866   Team Meeting   Meeting Type Daily Rounds   Team Members Present   Team Members Present Physician;Nurse;;Occupational Therapist   Physician Team Member Dr Gerard Najjar / Derick Avery Team Member [facility] Management Team Member Fco Núñez / Abebe Pina   OT Team Member Arlen Riggins / Jerel Tripp   Patient/Family Present   Patient Present No   Patient's Family Present No

## 2021-02-18 NOTE — DISCHARGE INSTR - APPOINTMENTS
Janas Halsted, RN, our [employer], will be calling you after your discharge, on the phone number that you provided  She will be available as an additional support, if needed  If you wish to speak with her, you may contact Dandre Henry at [phone]

## 2021-02-18 NOTE — DISCHARGE INSTR - OTHER ORDERS
You will be discharged today to your home at [address] [place] via [facility] referral for [address] and Medication Management has been made on your behalf  They will call you within a week of discharge to complete intake and provide you with contact information for Outpatient Provider  [facility]   Phone: [phone]        [employer] is a confidential 7 days/week telephone support service manned by trained mental health consumers  [employer] operates daily but is not able to accept calls between 2AM-6AM    [facility] provides support, a listening ear and can provide information about available services  [facility] specializes in the concerns of mental health consumers, their families and friends  However, we are also here for anyone who has a mental health concern, is confused about or just doesn't know anything about mental health or where to get information  To reach [place], call [phone] accepts calls between 6:00 AM to 10:00 AM and from 4:00 PM to 12:00 AM    Text CONNECT to [phone] from anywhere in the [place], anytime, about any type of crisis  A live, trained Crisis Counselor receives the text and lets you know that they are here to listen  The volunteer Crisis Counselor will help you move from a hot moment to a cool moment  [facility] ([facility]) AT [employer] [facility] - licensed telephone and mobile crisis services that provide mental health assessments to all age groups regardless of income or insurance  [facility] operates 24-hour/7 days a week  [address] [place] [address] assists consumer who are experiencing a mental health emergency and lack the resources to assist themselves  Immediate intervention for suicidal and depressed individuals with home visits/outreach being top priority  Crisis can be contacted at [phone]     [facility] ([facility]) offers various education & support groups for you & your family  For more information visit their website at   http://www [employer]/   Dial 2-1-1 to get connected/get help  Free, confidential information & referral available 24/7: Aging Services, Child & Youth Services, Counseling, Education/Training, Food/Shelter/Clothing, Health Services, Parenting, Substance Abuse, Support Groups, Volunteer Opportunities, & much more  Phone: 2-1-1 or [phone], Web: [employer] [email or web address] [facility], Email: [email or web address]@hotmail [email or web address]          What you need to know aboutcoronavirus disease 2019 (COVID-19)     What is coronavirus disease 2019 (COVID-19)? Coronavirus disease 2019 (COVID-19) is a respiratory illness that can spread from person to person  The virus that causes COVID-19 is a novel coronavirus that was first identified during an investigation into an outbreak in [place], [place]  Can people in the [place]  get COVID-19? Yes  COVID-19 is spreading from person to person in parts of the [place]  Risk of infection with COVID-19 is higher for people who are close contacts of someone known to have COVID-19, for example healthcare workers, or household members  Other people at higher risk for infection are those who live in or have recently been in an area with ongoing spread of COVID-19  Learn more about places with ongoing spread at   [employer] tn  html#geographic  Have there been cases of COVID-19 in the [place] ?   Yes  The first case of COVID-19 in the [place] was reported on January 21, 2020  The current count of cases of COVID-19 in the [place] is available on [employer] at [facility]  How does COVID-19 spread? The virus that causes COVID-19 probably emerged from an animal source, but is now spreading from person to person   The virus is thought to spread mainly between people who are in close contact with one another (within about 6 feet) through respiratory droplets produced when an infected person coughs or sneezes  It also may be possible that a person can get COVID-19 by touching a surface or object that has the virus on it and then touching their own mouth, nose, or possibly their eyes, but this is not thought to be the main way the virus spreads  Learn what is known about the spread of newly emerged coronaviruses at [facility]  What are the symptoms of COVID-19? Patients with COVID-19 have had mild to severe respiratory illness with symptoms of   fever   cough   shortness of breath  What are severe complications from this virus? Some patients have pneumonia in both lungs, multi-organ failure and in some cases death  How can I help protect myself? People can help protect themselves from respiratory illness with everyday preventive actions  Avoid close contact with people who are sick  Avoid touching your eyes, nose, and mouth with unwashed hands  Wash your hands often with soap and water for at least 20 seconds  Use an alcohol-based hand  that contains at least 60% alcohol if soap and water are not available  If you are sick, to keep from spreading respiratory illness to others, you should   Stay home when you are sick  Cover your cough or sneeze with a tissue, then throw the tissue in the trash  Clean and disinfect frequently touched objectsand surfaces  What should I do if I recently traveled from an area with ongoing spread of COVID-19? If you have traveled from an affected area, there may be restrictions on your movements for up to 2 weeks  If you develop symptoms during that period (fever, cough, trouble breathing), seek medical advice  Call the office of your health care provider before you go, and tell them about your travel and your symptoms   They will give you instructions on how to get care without exposing other people to your illness  While sick, avoid contact with people, don't go out and delay any travel to reduce the possibility of spreading illness to others  Is there a vaccine? There is currently no vaccine to protect against COVID-19  The best way to prevent infection is to take everyday preventive actions, like avoiding close contact with people who are sick and washing your hands often  Is there a treatment? There is no specific antiviral treatment for COVID-19  People with COVID-19 can seek medical care to helprelieve symptoms  For more information: www cdc gov/TONLJ60IH 553075-I 03/03/2020       What to do if you are sick withcoronavirus disease 2019 (COVID-19)     If you are sick with COVID-19 or suspect you are infected with the virus that causes COVID-19, follow the steps below to help prevent the disease from spreading to people in your home and community  Stay home except to get medical care   You should restrict activities outside your home, except for getting medical care  Do not go to work, school, or public areas  Avoid using public transportation, ride-sharing, or taxis  Separate yourself from other people and animals inyour home  People: As much as possible, you should stay in a specific room and away from other people in your home  Also, you should use a separate bathroom, if available  Animals: Do not handle pets or other animals while sick  See COVID-19 and Animals for more information  Call ahead before visiting your doctor   If you have a medical appointment, call the healthcare provider and tell them that you have or may have COVID-19  This will help the healthcare provider's office take steps to keep other people from getting infected or exposed  Wear a facemask  You should wear a facemask when you are around other people (e g , sharing a room or vehicle) or pets and before you enter a healthcare provider's office   If you are not able to wear a facemask (for example, because it causes trouble breathing), then people who live with you should not stay in the same room with you, or they should wear a facemask if they enteryour room  Cover your coughs and sneezes   Cover your mouth and nose with a tissue when you cough or sneeze  Throw used tissues in a lined trash can; immediately wash your hands with soap and water for at least 20 seconds or clean your hands with an alcohol-based hand  that contains at least 60 to 95% alcohol, covering all surfaces of your hands and rubbing them together until they feel dry  Soap and water should be used preferentially if hands are visibly dirty  Avoid sharing personal household items   You should not share dishes, drinking glasses, cups, eating utensils, towels, or bedding with other people or pets in your home  After using these items, they should be washed thoroughly with soap and water  Clean your hands often  Wash your hands often with soap and water for at least 20 seconds  If soap and water are not available, clean your hands with an alcohol-based hand  that contains at least 60% alcohol, covering all surfaces of your hands and rubbing them together until they feel dry  Soap and water should be used preferentially if hands are visibly dirty  Avoid touching your eyes, nose, and mouth with unwashed hands  Clean all "high-touch" surfaces every day  High touch surfaces include counters, tabletops, doorknobs, bathroom fixtures, toilets, phones, keyboards, tablets, and bedside tables  Also, clean any surfaces that may have blood, stool, or body fluids on them  Use a household cleaning spray or wipe, according to the label instructions  Labels contain instructions for safe and effective use of the cleaning product including precautions you should take when applying the product, such as wearing gloves and making sure you have good ventilation during use of the product    Monitor your symptoms  Seek prompt medical attention if your illness is worsening (e g , difficulty breathing)  Before seeking care, call your healthcare provider and tell them that you have, or are being evaluated for, COVID-19  Put on a facemask before you enter the facility  These steps will help the healthcare provider's office to keep other people in the office or waiting room from getting infectedor exposed  Ask your healthcare provider to call the local or state health department  Persons who are placed under active monitoring or facilitated self-monitoring should follow instructions provided by their local health department or occupational health professionals, as appropriate  If you have a medical emergency and need to call 911, notify the dispatch personnel that you have, or are being evaluated for COVID-19  If possible, put on a facemask before emergency medical services arrive  Discontinuing home isolation  Patients with confirmed COVID-19 should remain under home isolation precautions until the risk of secondary transmission to others is thought to be low  The decision to discontinue home isolation precautions should be made on a case-by-case basis, in consultation with healthcare providers and [facility] health departments  For more information: www cdc gov/GJUYG12GB 414461-Y 02/24/2020       Stay home when you are sick,except to get medical care  Wash your hands often with soap and water for at least 20 seconds  Cover your cough or sneeze with a tissue, then throw the tissue in the trash  Clean and disinfect frequently touched objects and surfaces  Avoid touching your eyes, nose, and mouth  STOP THE SPREAD OF GERMS  For more information: www cdc gov/COVID19 Avoid close contact with people who are sick  Help prevent the spread of respiratory diseases like COVID-19

## 2021-02-18 NOTE — PROGRESS NOTES
Calm/pleasant/cooperative/polite  Denies SI, HI, AVH  Attends groups  Laughs and smiles easily  Social with peers

## 2021-02-18 NOTE — CASE MANAGEMENT
CM met with pt to complete intake and sign ZACHARIAH for dc planning  Pt was admitted on: 2/16/2021 from [facility] ED  Reasons for admission/stressors: [place] PD brought pt to [address] [place] [address] ED due to SI, increased depression after argument with girlfriend  2/16/2021 ED Note:  "earlier today attempted to cut his neck with a knife, superficial abrasions noted to right side of the neck  patient presents with very flat affect, and expresses concern about what will happen if he leaves the hospital "     Pt wishes to return to/with: Pt will return to his apartment in [place] that he shares with girlfriend/ex-girlfriend     Outpatient Services: Pt does not have active insurance but is open to [facility] Outpatient Referral (CM will complete referral)     Current medications: Lexapro 5mg     D&A Resources/Rehab?: N/A    UDS: Negative   BAT: Negative     PCP: None   ICM: None     Pharmacy: Pt uses [facility], [address] [place] [address]     Prior IP Treatment: Denies prior treatment  But reported "I have been able to deal with my depression in the past, but once I get too far into it, it is difficult to get myself out of it "     Medical Concerns: Allergic to [place] System: None at this time  Pt open to [facility] Referral  Pt also met with [facility] and has a list of documents that he can submit to apply for Medical Assistance  Legal Issues: Denies     Access to firearms: Denies    Income/Employment: Works at [employer]  Pt reported "they may ask me to come in to work tomorrow 2/20/2021" - CM provided pt with return to work note  Emergency Contacts/Supports: Dena Kimble     Transportation at DC: Pt will need transportation  He reported that he could call a friend to get him but is open to [employer]       ZACHARIAH signed for:   [facility]

## 2021-02-19 VITALS
WEIGHT: 173.8 LBS | HEART RATE: 82 BPM | SYSTOLIC BLOOD PRESSURE: 123 MMHG | TEMPERATURE: 97.8 F | DIASTOLIC BLOOD PRESSURE: 69 MMHG | RESPIRATION RATE: 16 BRPM | HEIGHT: 70 IN | OXYGEN SATURATION: 97 % | BODY MASS INDEX: 24.88 KG/M2

## 2021-02-19 PROCEDURE — 99239 HOSP IP/OBS DSCHRG MGMT >30: CPT | Performed by: STUDENT IN AN ORGANIZED HEALTH CARE EDUCATION/TRAINING PROGRAM

## 2021-02-19 RX ORDER — HYDROXYZINE 50 MG/1
50 TABLET, FILM COATED ORAL EVERY 8 HOURS PRN
Qty: 30 TABLET | Refills: 2 | Status: SHIPPED | OUTPATIENT
Start: 2021-02-19

## 2021-02-19 RX ORDER — ESCITALOPRAM OXALATE 10 MG/1
5 TABLET ORAL DAILY
Qty: 30 TABLET | Refills: 1 | Status: SHIPPED | OUTPATIENT
Start: 2021-02-20

## 2021-02-19 RX ADMIN — HYDROXYZINE HYDROCHLORIDE 50 MG: 50 TABLET, FILM COATED ORAL at 11:59

## 2021-02-19 RX ADMIN — Medication 1 PATCH: at 08:57

## 2021-02-19 RX ADMIN — ESCITALOPRAM 5 MG: 5 TABLET, FILM COATED ORAL at 08:58

## 2021-02-19 NOTE — PLAN OF CARE
Problem: SELF HARM/SUICIDALITY  Goal: Will have no self-injury during hospital stay  Description: INTERVENTIONS:  - Q 15 MINUTES: Routine safety checks  - Q WAKING SHIFT & PRN: Assess risk to determine if routine checks are adequate to maintain patient safety  - Encourage patient to participate actively in care by formulating a plan to combat response to suicidal ideation, identify supports and resources  Outcome: Adequate for Discharge     Problem: DEPRESSION  Goal: Will be euthymic at discharge  Description: INTERVENTIONS:  - Administer medication as ordered  - Provide emotional support via 1:1 interaction with staff  - Encourage involvement in milieu/groups/activities  - Monitor for social isolation  Outcome: Adequate for Discharge     Problem: ANXIETY  Goal: Will report anxiety at manageable levels  Description: INTERVENTIONS:  - Administer medication as ordered  - Teach and encourage coping skills  - Provide emotional support  - Assess patient/family for anxiety and ability to cope  Outcome: Adequate for Discharge  Goal: By discharge: Patient will verbalize 2 strategies to deal with anxiety  Description: Interventions:  - Identify any obvious source/trigger to anxiety  - Staff will assist patient in applying identified coping technique/skills  - Encourage attendance of scheduled groups and activities  Outcome: Adequate for Discharge     Problem: Ineffective Coping  Goal: Participates in unit activities  Description: Interventions:  - Provide therapeutic environment   - Provide required programming   - Redirect inappropriate behaviors   Outcome: Adequate for Discharge     Problem: DISCHARGE PLANNING  Goal: Discharge to home or other facility with appropriate resources  Description: INTERVENTIONS:  - Identify barriers to discharge w/patient and caregiver  - Arrange for needed discharge resources and transportation as appropriate  - Identify discharge learning needs (meds, wound care, etc )  - Arrange for interpretive services to assist at discharge as needed  - Refer to Case Management Department for coordinating discharge planning if the patient needs post-hospital services based on physician/advanced practitioner order or complex needs related to functional status, cognitive ability, or social support system  Outcome: Adequate for Discharge

## 2021-02-19 NOTE — DISCHARGE SUMMARY
Discharge Summary - [address] [facility],[address] 26 y o  male MRN: [id number]  Unit/Bed#: -01 Encounter: [id number]     Admission Date:   Admission Orders (From admission, onward)     Ordered        02/16/21 2240  ED TO DIFFERENT CAMPUS IP [address] UNIT or INPATIENT MEDICAL UNIT to [facility] (using Discharge Readmit Navigator) - Admit Patient to [address] [place] [address]  Once                         Discharge Date: 2/19/2021 12:30 PM    Attending Psychiatrist: Dr Lucia Modi    Reason for Admission/HPI:   History of Present Illness     Patient is a 60-year-old male who presented to [facility] ED by police due to suicidal behavior by cutting himself with a knife at his neck superficially  During crisis evaluation patient also asked his girlfriend to stab him  Apparently patient's relationship was described as toxic for the last few months  That day they had an argument about breaking up  On initial psychiatric evaluation patient reported over the previous few months increased depression with symptoms of poor sleep, low energy, anhedonia, hopelessness, and fleeting suicidal thoughts  He did report increased anxiety without panic attacks  He denied psychosis and delusional material   He did not show signs of christian  He does not have history of christian  He did admit to previous cutting behavior after a break-up with another girlfriend    Patient denied previous inpatient psychiatric hospitalizations, denied previous suicide attempts, and did not have current outpatient psychiatrist or therapist     Psychosocial Stressors: relationship    Hospital Course:   Behavioral Health Medications:   current meds:   Current Facility-Administered Medications   Medication Dose Route Frequency    acetaminophen (TYLENOL) tablet 650 mg  650 mg Oral Q8H PRN    albuterol (PROVENTIL HFA,VENTOLIN HFA) inhaler 2 puff  2 puff Inhalation Q4H PRN    haloperidol lactate (HALDOL) injection 5 mg  5 mg Intramuscular Q4H PRN Max 4/day    And    LORazepam (ATIVAN) injection 2 mg  2 mg Intramuscular Q4H PRN Max 4/day    And    benztropine (COGENTIN) injection 1 mg  1 mg Intramuscular Q4H PRN Max 4/day    hydrOXYzine HCL (ATARAX) tablet 50 mg  50 mg Oral Q6H PRN Max 4/day    Or    diphenhydrAMINE (BENADRYL) injection 50 mg  50 mg Intramuscular Q6H PRN    escitalopram (LEXAPRO) tablet 5 mg  5 mg Oral Daily    LORazepam (ATIVAN) tablet 1 mg  1 mg Oral Q6H PRN    nicotine (NICODERM CQ) 14 mg/24hr TD 24 hr patch 1 patch  1 patch Transdermal Daily    risperiDONE (RisperDAL M-TABS) dispersible tablet 0 5 mg  0 5 mg Oral Q4H PRN Max 6/day    traZODone (DESYREL) tablet 50 mg  50 mg Oral HS PRN       Patient was admitted to [facility] inpatient psychiatric unit on voluntary 201 commitment for safety and stabilization  On admission patient was placed on Lexapro 5mg QD for depression and anxiety  He was quickly focused on discharge and did not wish to stay longer  He did not display 302 behavior  He did require PRN Trazodone 50mg HS and PRN Atarax 50mg for anxiety  He was given a script for Atarax at discharge  He tolerated medications with no acute side effects  His mood brightened over the course of his treatment, and he was seen in [facility] interacting appropriately with peers  He did not demonstrate dangerous behavior to self or others during his inpatient stay  On day of discharge patient had reduced depression, controllable anxiety, denied psychosis, did not show signs of christian, and denied suicidal/homicidal ideations  Mental Status at time of Discharge:     Appearance:  casually dressed   Behavior:  cooperative   Speech:  normal pitch and normal volume   Mood:  euthymic   Affect:  mood-congruent   Thought Process:  normal   Thought Content:  normal   Perceptual Disturbances: None   Risk Potential: Denied SI/HI    Potential for aggression: no   Sensorium:  person, place and time/date   Cognition:  recent and remote memory grossly intact   Consciousness:  alert and awake    Attention: attention span and concentration were age appropriate   Insight:  fair   Judgment: fair   Gait/Station: normal gait/station and normal balance   Motor Activity: no abnormal movements       Discharge Diagnosis:   Recurrent major depressive disorder   Anxiety      Discharge Medications:  See after visit summary for reconciled discharge medications provided to patient and family  Discharge instructions/Information to patient and family:   See after visit summary for information provided to patient and family  Provisions for Follow-Up Care:  See after visit summary for information related to follow-up care and any pertinent home health orders  Discharge Statement   I spent 32 minutes discharging the patient  This time was spent on the day of discharge  I had direct contact with the patient on the day of discharge  On day of discharge patient had mental status exam performed, discharge instructions/medications reviewed, and outpatient planning discussed  He was given 1 month plus 1 refill of scripts  He denied tobacco cessation therapy after discharge      Nam Douglas PA-C

## 2021-02-19 NOTE — PROGRESS NOTES
Status: Pt denies SI  Social and reporting improvement  Pt attends groups and compliant with medication  Medication: No changes / PRN: Atarax 50mg    D/C: Friday/Monday  Pt discussed signing 72 to leave today, but has not signed yet        02/19/21 2024   Team Meeting   Meeting Type Daily Rounds   Team Members Present   Team Members Present Physician;Nurse;;Occupational Therapist   Physician Team Member Dr Herman Thompson / Lula Og Team Member Sarah Mendoza / Shirley Henriquez Management Team Member Hua   OT Team Member Jac Rosales / Tristen Rock   Patient/Family Present   Patient Present No   Patient's Family Present No

## 2021-02-19 NOTE — PROGRESS NOTES
Pt visible in the milieu  Pt reports to this writer that he's doing "pretty great  It's been a very positive day  I'm having a good time honestly  Pt denies anxiety, depression and SI/HI/AVH  Pt denies having any questions or concerns

## 2021-02-19 NOTE — PROGRESS NOTES
Pt remains pleasant and calm  Denies SI/HI, verbally contracts for safety  Reports feeling hopeful for discharge and denies any questions  Social with peers on the unit

## 2021-02-19 NOTE — NURSING NOTE
AVS reviewed and prescriptions given  Pt expresses understanding  Denies any further questions or concerns  Pt discharged home via [facility] transport

## 2021-02-19 NOTE — CASE MANAGEMENT
2/19/2021-1:48 pm   CM called Caroline Faust at [facility] ([phone]) and left detailed Voicemail for pt to be referred for [facility] Outpatient and Medication Management  CM provided Caroline Christianne with pt information, that pt was discharged today and that he was in agreement with this referral  CM gave Caroline Faust pt cell phone number to call that he confirmed is active

## 2024-01-17 ENCOUNTER — OFFICE VISIT (OUTPATIENT)
Dept: URGENT CARE | Facility: CLINIC | Age: 29
End: 2024-01-17
Payer: COMMERCIAL

## 2024-01-17 VITALS
TEMPERATURE: 98.8 F | OXYGEN SATURATION: 97 % | HEART RATE: 88 BPM | RESPIRATION RATE: 18 BRPM | DIASTOLIC BLOOD PRESSURE: 72 MMHG | SYSTOLIC BLOOD PRESSURE: 168 MMHG

## 2024-01-17 DIAGNOSIS — B97.89 VIRAL SINUSITIS: ICD-10-CM

## 2024-01-17 DIAGNOSIS — J02.9 ACUTE VIRAL PHARYNGITIS: ICD-10-CM

## 2024-01-17 DIAGNOSIS — J32.9 VIRAL SINUSITIS: ICD-10-CM

## 2024-01-17 DIAGNOSIS — J02.9 ACUTE PHARYNGITIS, UNSPECIFIED ETIOLOGY: Primary | ICD-10-CM

## 2024-01-17 PROCEDURE — 87147 CULTURE TYPE IMMUNOLOGIC: CPT | Performed by: PHYSICIAN ASSISTANT

## 2024-01-17 PROCEDURE — 99213 OFFICE O/P EST LOW 20 MIN: CPT | Performed by: PHYSICIAN ASSISTANT

## 2024-01-17 PROCEDURE — 87070 CULTURE OTHR SPECIMN AEROBIC: CPT | Performed by: PHYSICIAN ASSISTANT

## 2024-01-17 PROCEDURE — S9088 SERVICES PROVIDED IN URGENT: HCPCS | Performed by: PHYSICIAN ASSISTANT

## 2024-01-17 NOTE — PROGRESS NOTES
[facility]        NAME: Wilbert Begum is a 28 y.o. male  : 1995    MRN: [id number]  DATE: 2024  TIME: 9:29 AM    Assessment and Plan   Acute pharyngitis, unspecified etiology [J02.9]  1. Acute pharyngitis, unspecified etiology  POCT rapid strepA      2. Acute viral pharyngitis        3. Viral sinusitis              Patient Instructions     Your rapid strep test was negative. No antibiotics are needed at this time.     Throat swab will be sent for definitive culture. You can download [facility] MyChart for the results which take approximately 48-72 hours. You will be notified if positive.     Try Afrin  nasal spray -DO NOT use more than 3 days (or may stretch to 4 nights if only using at night)  Try a medicine such as Sudafed D or Aleve cold and sinus -purchase in pharmacy to help with nasal congestion.     For sore throat you can use Cepacol lozenges, do warm salt water gargles, drink warm water with lemon or herbal teas, or use an over-the-counter throat spray (Chloraseptic).    Follow up with your PCP in 3-5 days if symptoms persist.    Go to the ER if symptoms significantly worsen.   Follow up with PCP in 3-5 days.  Proceed to  ER if symptoms worsen.    Chief Complaint     Chief Complaint   Patient presents with    Sore Throat     Patient has had a sore throat and dry mouth for the last couple of days          History of Present Illness       Sore Throat   Episode onset: 2 days ago. The problem has been gradually worsening. There has been no fever. Associated symptoms include congestion, coughing, a hoarse voice and trouble swallowing. Pertinent negatives include no abdominal pain, drooling, ear pain, shortness of breath or vomiting. Treatments tried: Mucinex, Flonase, Ibuprofen, NyQuil. The treatment provided no relief.   Able to pass foods and liquids but c/o painful swallowing  PMH: asthma    Review of Systems   Review of Systems   Constitutional:  Positive for chills. Negative  for fever.   HENT:  Positive for congestion, hoarse voice, sore throat and trouble swallowing. Negative for drooling and ear pain.    Eyes:  Negative for pain and visual disturbance.   Respiratory:  Positive for cough. Negative for shortness of breath.    Cardiovascular:  Negative for chest pain and palpitations.   Gastrointestinal:  Negative for abdominal pain and vomiting.   Genitourinary:  Negative for dysuria and hematuria.   Musculoskeletal:  Negative for arthralgias and back pain.   Skin:  Negative for color change and rash.   Neurological:  Negative for seizures and syncope.   All other systems reviewed and are negative.        Current Medications       Current Outpatient Medications:     albuterol (PROVENTIL HFA,VENTOLIN HFA) 90 mcg/act inhaler, Inhale 2 puffs every 6 (six) hours as needed for wheezing (Patient not taking: Reported on 1/17/2024), Disp: , Rfl:     escitalopram (LEXAPRO) 10 mg tablet, Take 0.5 tablets (5 mg total) by mouth daily At 9am (Patient not taking: Reported on 1/17/2024), Disp: 30 tablet, Rfl: 1    hydrOXYzine HCL (ATARAX) 50 mg tablet, Take 1 tablet (50 mg total) by mouth every 8 (eight) hours as needed (moderate anxiety) (Patient not taking: Reported on 1/17/2024), Disp: 30 tablet, Rfl: 2    Current Allergies     Allergies as of 01/17/2024 - Reviewed 01/17/2024   Allergen Reaction Noted    Nuts - food allergy Anaphylaxis 02/17/2021    [place] (diagnostic) - food allergy Dizziness 07/02/2020            The following portions of the patient's history were reviewed and updated as appropriate: allergies, current medications, past family history, past medical history, past social history, past surgical history and problem list.     Past Medical History:   Diagnosis Date    Asthma        No past surgical history on file.    No family history on file.      Medications have been verified.        Objective   /72   Pulse 88   Temp 98.8 °F (37.1 °C)   Resp 18   SpO2 97%   No LMP for  male patient.       Physical Exam     Physical Exam  Vitals and nursing note reviewed.   Constitutional:       Appearance: Normal appearance. He is not ill-appearing.   HENT:      Head: Normocephalic and atraumatic.      Right Ear: Tympanic membrane and ear canal normal.      Left Ear: Tympanic membrane and ear canal normal.      Nose: Congestion present.      Comments: Bilat turbinates edematous and erythematous     Mouth/Throat:      Comments: Uvula mid-line with white exudate  Tongue appears slightly pale in appearance but no exudate    Tonsils with 1+ hypertrophy bilat and exudate on left tonsil  Eyes:      Conjunctiva/sclera: Conjunctivae normal.   Cardiovascular:      Rate and Rhythm: Normal rate and regular rhythm.      Pulses: Normal pulses.      Heart sounds: Normal heart sounds.   Pulmonary:      Effort: Pulmonary effort is normal.      Breath sounds: Normal breath sounds.   Lymphadenopathy:      Cervical: No cervical adenopathy.   Skin:     General: Skin is warm and dry.   Neurological:      Mental Status: He is alert and oriented to person, place, and time.   Psychiatric:         Mood and Affect: Mood normal.         Behavior: Behavior normal.       Rapid strep: neg

## 2024-01-17 NOTE — PATIENT INSTRUCTIONS
Your rapid strep test was negative. No antibiotics are needed at this time.     Throat swab will be sent for definitive culture. You can download [email or web address] for the results which take approximately 48-72 hours. You will be notified if positive.     Try Afrin  nasal spray -DO NOT use more than 3 days (or may stretch to 4 nights if only using at night)  Try a medicine such as Sudafed D or Aleve cold and sinus -purchase in pharmacy to help with nasal congestion.     For sore throat you can use Cepacol lozenges, do warm salt water gargles, drink warm water with lemon or herbal teas, or use an over-the-counter throat spray (Chloraseptic).    Follow up with your PCP in 3-5 days if symptoms persist.    Go to the ER if symptoms significantly worsen.   Follow up with PCP in 3-5 days.  Proceed to  ER if symptoms worsen.

## 2024-01-17 NOTE — LETTER
January 17, 2024     Patient: Wilbert Begum   YOB: 1995   Date of Visit: 1/17/2024       To Whom It May Concern:    It is my medical opinion that Wilbert Begum may return to work on 1/18/2024 .    If you have any questions or concerns, please don't hesitate to call.         Sincerely,        Loren Hubbard PA-C    CC: No Recipients

## 2024-01-19 ENCOUNTER — TELEPHONE (OUTPATIENT)
Dept: URGENT CARE | Facility: CLINIC | Age: 29
End: 2024-01-19

## 2024-01-19 DIAGNOSIS — J02.0 STREP PHARYNGITIS: Primary | ICD-10-CM

## 2024-01-19 LAB — BACTERIA THROAT CULT: ABNORMAL

## 2024-01-19 RX ORDER — AMOXICILLIN 500 MG/1
500 CAPSULE ORAL EVERY 12 HOURS SCHEDULED
Qty: 20 CAPSULE | Refills: 0 | Status: SHIPPED | OUTPATIENT
Start: 2024-01-19 | End: 2024-01-29

## 2024-01-19 NOTE — TELEPHONE ENCOUNTER
Spoke with Wilbert and notified that Strep culture was positive.  Antibiotic was sent to pharmacy.